# Patient Record
(demographics unavailable — no encounter records)

---

## 2024-11-22 NOTE — HISTORY OF PRESENT ILLNESS
[Home] : at home, [unfilled] , at the time of the visit. [Medical Office: ([facility])___] : at the medical office located in  [Verbal consent obtained from patient] : the patient, [unfilled] [FreeTextEntry1] : Depression [de-identified] : 24 y/o female who c/o low energy, fatigue, and depressed mood. Has a harder time during the winter but this year overall has been difficult. Falling asleep early and waking up early. Less interest in socializing. Home life is fine She has been using a light box for the past few years but it doesnt seem to be helping

## 2024-11-22 NOTE — REVIEW OF SYSTEMS
[Suicidal] : not suicidal [Insomnia] : no insomnia [Depression] : depression [Negative] : Constitutional

## 2024-11-22 NOTE — PLAN
[FreeTextEntry1] : Depression She does not have Suicidal/homicidal ideations Will start lexapro 10 mg. RIsk/benefits discussed with patient Administration discussed with patient f/u 1 month

## 2024-11-22 NOTE — PHYSICAL EXAM
[No Acute Distress] : no acute distress [Well Nourished] : well nourished [Alert and Oriented x3] : oriented to person, place, and time [Normal Insight/Judgement] : insight and judgment were intact [de-identified] : low mood

## 2024-12-19 NOTE — REVIEW OF SYSTEMS
[Depression] : depression [Negative] : Constitutional [Suicidal] : not suicidal [Insomnia] : no insomnia

## 2024-12-19 NOTE — PHYSICAL EXAM
[No Acute Distress] : no acute distress [Well Nourished] : well nourished [Alert and Oriented x3] : oriented to person, place, and time [Normal Insight/Judgement] : insight and judgment were intact [de-identified] : smiling, more energy

## 2024-12-19 NOTE — HISTORY OF PRESENT ILLNESS
[Home] : at home, [unfilled] , at the time of the visit. [Medical Office: ([facility])___] : at the medical office located in  [Verbal consent obtained from patient] : the patient, [unfilled] [FreeTextEntry1] : Depression [de-identified] : 25-year-old female presents for follow-up.  Last month she was started on Lexapro 10 mg for feelings of depression, low energy, low mood.  Since starting the Lexapro she has noticed that she has more energy, mood is better. no side effects to the medication

## 2025-05-06 NOTE — HISTORY OF PRESENT ILLNESS
[FreeTextEntry1] : Annual physical [de-identified] : 25-year-old female with h/o depression who presents for an annual physical. She was started on Lexapro 10 mg. She mostly has symptoms in the winter months and wonders if she should stop it for the winter. She takes Zyrtec daily because she gets hives with sweating or swimming takes a multivitamin and lysine to prevent cold sores. She had a recent cold sore. Saw a dermatologist via telehealth and got medication   running, gym diet is healthy  GYN: never Derm: never

## 2025-05-06 NOTE — PLAN
[FreeTextEntry1] : Depression Stable on Lexapro 10. She will continue throughout the summer. If she decides she wants to stop advised her that she will need to taper it down  Health Maintenance Depression screening negative  BP is controlled Maintain healthy lifestyle with balanced diet and exercise Check labs today, including CBC, CMP, TSH, HbA1c, lipids. Blood collected in office.  No HIV/STD risk factors GYN exam in the next 1 to 2 years Schedule skin exam Follow-up 1 year or as needed based on above